# Patient Record
Sex: MALE | Race: WHITE | NOT HISPANIC OR LATINO | URBAN - NONMETROPOLITAN AREA
[De-identification: names, ages, dates, MRNs, and addresses within clinical notes are randomized per-mention and may not be internally consistent; named-entity substitution may affect disease eponyms.]

---

## 2023-04-20 ENCOUNTER — OFFICE VISIT (OUTPATIENT)
Dept: URGENT CARE | Facility: PHYSICIAN GROUP | Age: 63
End: 2023-04-20
Payer: COMMERCIAL

## 2023-04-20 ENCOUNTER — APPOINTMENT (OUTPATIENT)
Dept: URGENT CARE | Facility: PHYSICIAN GROUP | Age: 63
End: 2023-04-20
Payer: COMMERCIAL

## 2023-04-20 ENCOUNTER — APPOINTMENT (OUTPATIENT)
Dept: RADIOLOGY | Facility: IMAGING CENTER | Age: 63
End: 2023-04-20
Attending: NURSE PRACTITIONER
Payer: COMMERCIAL

## 2023-04-20 VITALS
HEIGHT: 73 IN | OXYGEN SATURATION: 100 % | TEMPERATURE: 97.4 F | WEIGHT: 225 LBS | SYSTOLIC BLOOD PRESSURE: 126 MMHG | DIASTOLIC BLOOD PRESSURE: 84 MMHG | HEART RATE: 96 BPM | BODY MASS INDEX: 29.82 KG/M2 | RESPIRATION RATE: 14 BRPM

## 2023-04-20 DIAGNOSIS — M79.672 LEFT FOOT PAIN: ICD-10-CM

## 2023-04-20 PROCEDURE — 99203 OFFICE O/P NEW LOW 30 MIN: CPT | Performed by: NURSE PRACTITIONER

## 2023-04-20 PROCEDURE — 73630 X-RAY EXAM OF FOOT: CPT | Mod: TC,FY,LT | Performed by: RADIOLOGY

## 2023-04-20 RX ORDER — INDOMETHACIN 50 MG/1
50 CAPSULE ORAL 3 TIMES DAILY
Qty: 30 CAPSULE | Refills: 0 | Status: SHIPPED | OUTPATIENT
Start: 2023-04-20 | End: 2023-04-30

## 2023-04-20 NOTE — PROGRESS NOTES
"  Subjective:     Mirza Wright is a 62 y.o. male who presents for Foot Injury (Great toe. Hyperextended. Yesterday )      Patients states he was pushing a heavy shelf with his foot. States his big toe bent backwards. Pain to great toe is currently mild, stating it was worse. Has not taken any medications for the pain. Numbness in area of MCP.             Foot Problem  This is a new problem. The current episode started yesterday. The problem has been waxing and waning. Associated symptoms include joint swelling and numbness. Pertinent negatives include no fever. The symptoms are aggravated by walking and bending. He has tried nothing for the symptoms.     No past medical history on file.    No past surgical history on file.    Social History     Socioeconomic History    Marital status:      Spouse name: Not on file    Number of children: Not on file    Years of education: Not on file    Highest education level: Not on file   Occupational History    Not on file   Tobacco Use    Smoking status: Not on file    Smokeless tobacco: Not on file   Substance and Sexual Activity    Alcohol use: Not on file    Drug use: Not on file    Sexual activity: Not on file   Other Topics Concern    Not on file   Social History Narrative    Not on file     Social Determinants of Health     Financial Resource Strain: Not on file   Food Insecurity: Not on file   Transportation Needs: Not on file   Physical Activity: Not on file   Stress: Not on file   Social Connections: Not on file   Intimate Partner Violence: Not on file   Housing Stability: Not on file        No family history on file.     No Known Allergies    Review of Systems   Constitutional:  Negative for fever.   Musculoskeletal:  Positive for joint pain and joint swelling.   Neurological:  Positive for numbness.   All other systems reviewed and are negative.     Objective:   /84   Pulse 96   Temp 36.3 °C (97.4 °F) (Temporal)   Resp 14   Ht 1.854 m (6' 1\")   Wt " 102 kg (225 lb)   SpO2 100%   BMI 29.69 kg/m²     Physical Exam  Vitals reviewed.   Constitutional:       General: He is not in acute distress.     Appearance: He is not toxic-appearing.   Cardiovascular:      Rate and Rhythm: Normal rate.      Pulses:           Dorsalis pedis pulses are 2+ on the left side.   Pulmonary:      Effort: Pulmonary effort is normal. No respiratory distress.   Musculoskeletal:         General: Swelling and tenderness present.      Left foot: Normal range of motion and normal capillary refill. Swelling, tenderness and bony tenderness present. No crepitus. Normal pulse.      Comments: Left foot. Dorsal foot swelling, greatest over MCP. TTP over 1st MCP and distal first metatarsal. No distal great toe TTP.    Feet:      Left foot:      Skin integrity: No ulcer, blister or skin breakdown.   Skin:     General: Skin is warm and dry.      Capillary Refill: Capillary refill takes less than 2 seconds.   Neurological:      Mental Status: He is alert and oriented to person, place, and time.   Psychiatric:         Behavior: Behavior normal.       Assessment/Plan:   1. Left foot pain  - DX-FOOT-COMPLETE 3+ LEFT; Future  DX-FOOT-COMPLETE 3+ LEFT    Result Date: 4/20/2023 4/20/2023 2:50 PM HISTORY/REASON FOR EXAM:  Left foot pain after left foot injury TECHNIQUE/EXAM DESCRIPTION AND NUMBER OF VIEWS: 3 nonweightbearing views of the LEFT foot. COMPARISON:  No comparison available FINDINGS:  Bone mineralization is normal.  There is no evidence of fracture or dislocation. Inferior calcaneal spur is identified. Soft tissues are normal.     No evidence of fracture or dislocation.    -Hard soled shoe as tolerated with a wide toe base.   -Monitor for possible diet correlation and modify diet. Limit alcohol intake.  -Oral hydration.  -RICE Therapy: Rest, Ice, Compression, Elevation  -Can also take tylenol as directed for pain.     Follow up with PCP. Follow up for persistent or worsening symptoms, fever,  chills, signs of infection. Emergently for severe uncontrolled pain, neurovascular compromise (decreased sensation, motion, or circulation).    -Imaging ordered with possibility of traumatic injury. Discussed exam findings to be consistent with gout vs a traumatic injury. Patient denies hx of gout. Does drink beer nightly. No fracture noted on x-ray. Upon clarifying hx of injury: Pt was unsure if he felt a specific injury at the time of moving the shelf, or if it developed later.  Discussed differential to include gout with area of tenderness and erythema centered over the 1st MCP. No skin breakdown. Advised treatment for a foot strain or gout would be similar, and to f/u for persistent or worsening symptoms.      Differential diagnosis, natural history, supportive care, and indications for immediate follow-up discussed.

## 2023-04-20 NOTE — PATIENT INSTRUCTIONS
-Hard soled shoe as tolerated with a wide toe base.   -Monitor for possible diet correlation and modify diet. Limit alcohol intake.  -Oral hydration.  -RICE Therapy: Rest, Ice, Compression, Elevation  -Can also take tylenol as directed for pain.     Follow up with PCP. Follow up for persistent or worsening symptoms, fever, chills, signs of infection. Emergently for severe uncontrolled pain, neurovascular compromise (decreased sensation, motion, or circulation).

## 2023-04-22 ASSESSMENT — ENCOUNTER SYMPTOMS
JOINT SWELLING: 1
NUMBNESS: 1
FEVER: 0